# Patient Record
Sex: MALE | Race: WHITE
[De-identification: names, ages, dates, MRNs, and addresses within clinical notes are randomized per-mention and may not be internally consistent; named-entity substitution may affect disease eponyms.]

---

## 2017-10-25 ENCOUNTER — HOSPITAL ENCOUNTER (EMERGENCY)
Dept: HOSPITAL 35 - ER | Age: 52
Discharge: HOME | End: 2017-10-25
Payer: COMMERCIAL

## 2017-10-25 VITALS — WEIGHT: 290 LBS | HEIGHT: 75 IN | BODY MASS INDEX: 36.06 KG/M2

## 2017-10-25 DIAGNOSIS — R20.2: ICD-10-CM

## 2017-10-25 DIAGNOSIS — I26.99: ICD-10-CM

## 2017-10-25 DIAGNOSIS — J90: Primary | ICD-10-CM

## 2017-10-25 DIAGNOSIS — J18.9: ICD-10-CM

## 2017-10-25 LAB
ANION GAP SERPL CALC-SCNC: 6 MMOL/L (ref 7–16)
APTT BLD: 47.1 SECONDS (ref 24.5–32.8)
BASOPHILS NFR BLD AUTO: 0.7 % (ref 0–2)
BUN SERPL-MCNC: 15 MG/DL (ref 7–18)
CALCIUM SERPL-MCNC: 9.2 MG/DL (ref 8.5–10.1)
CHLORIDE SERPL-SCNC: 104 MMOL/L (ref 98–107)
CO2 SERPL-SCNC: 27 MMOL/L (ref 21–32)
CREAT SERPL-MCNC: 1.4 MG/DL (ref 0.7–1.3)
EOSINOPHIL NFR BLD: 2.1 % (ref 0–3)
ERYTHROCYTE [DISTWIDTH] IN BLOOD BY AUTOMATED COUNT: 13.9 % (ref 10.5–14.5)
GLUCOSE SERPL-MCNC: 104 MG/DL (ref 74–106)
GRANULOCYTES NFR BLD MANUAL: 65.4 % (ref 36–66)
HCT VFR BLD CALC: 40.1 % (ref 42–52)
HGB BLD-MCNC: 13.4 GM/DL (ref 14–18)
INR PPP: 3.1
LYMPHOCYTES NFR BLD AUTO: 21 % (ref 24–44)
MANUAL DIFFERENTIAL PERFORMED BLD QL: NO
MCH RBC QN AUTO: 30.7 PG (ref 26–34)
MCHC RBC AUTO-ENTMCNC: 33.4 G/DL (ref 28–37)
MCV RBC: 91.8 FL (ref 80–100)
MONOCYTES NFR BLD: 10.8 % (ref 1–8)
NEUTROPHILS # BLD: 5.1 THOU/UL (ref 1.4–8.2)
PLATELET # BLD: 325 THOU/UL (ref 150–400)
POTASSIUM SERPL-SCNC: 4.1 MMOL/L (ref 3.5–5.1)
PROTHROMBIN TIME: 30.8 SECONDS (ref 9.3–11.4)
RBC # BLD AUTO: 4.37 MIL/UL (ref 4.5–6)
SODIUM SERPL-SCNC: 137 MMOL/L (ref 136–145)
WBC # BLD AUTO: 7.9 THOU/UL (ref 4–11)

## 2017-12-26 ENCOUNTER — HOSPITAL ENCOUNTER (OUTPATIENT)
Dept: HOSPITAL 35 - RAD | Age: 52
End: 2017-12-26
Attending: FAMILY MEDICINE
Payer: COMMERCIAL

## 2017-12-26 DIAGNOSIS — I27.82: Primary | ICD-10-CM

## 2017-12-26 DIAGNOSIS — R91.8: ICD-10-CM

## 2017-12-26 DIAGNOSIS — J98.11: ICD-10-CM

## 2017-12-26 DIAGNOSIS — J18.9: ICD-10-CM

## 2018-01-30 ENCOUNTER — HOSPITAL ENCOUNTER (OUTPATIENT)
Dept: HOSPITAL 35 - RAD | Age: 53
End: 2018-01-30
Attending: NURSE PRACTITIONER
Payer: COMMERCIAL

## 2018-01-30 DIAGNOSIS — R05: ICD-10-CM

## 2018-01-30 DIAGNOSIS — R06.02: Primary | ICD-10-CM

## 2019-01-16 ENCOUNTER — HOSPITAL ENCOUNTER (OUTPATIENT)
Dept: HOSPITAL 35 - CAT | Age: 54
End: 2019-01-16
Attending: FAMILY MEDICINE
Payer: COMMERCIAL

## 2019-01-16 DIAGNOSIS — Z13.6: Primary | ICD-10-CM

## 2019-01-16 DIAGNOSIS — E78.00: ICD-10-CM

## 2019-01-16 DIAGNOSIS — Z82.49: ICD-10-CM

## 2019-03-29 ENCOUNTER — HOSPITAL ENCOUNTER (EMERGENCY)
Dept: HOSPITAL 44 - ED | Age: 54
Discharge: HOME | End: 2019-03-29
Payer: COMMERCIAL

## 2019-03-29 VITALS
SYSTOLIC BLOOD PRESSURE: 121 MMHG | DIASTOLIC BLOOD PRESSURE: 68 MMHG | SYSTOLIC BLOOD PRESSURE: 121 MMHG | DIASTOLIC BLOOD PRESSURE: 68 MMHG

## 2019-03-29 DIAGNOSIS — J18.9: Primary | ICD-10-CM

## 2019-03-29 LAB
BASOPHILS NFR BLD: 0.6 % (ref 0–1.5)
EGFR (NON-AFRICAN): > 60
EOSINOPHIL NFR BLD: 2.9 % (ref 0–6.8)
MCH RBC QN AUTO: 31.9 PG (ref 28–34)
MCV RBC AUTO: 95 FL (ref 80–100)
MONOCYTES %: 7 % (ref 0–11)
NEUTROPHILS #: 5.9 # K/UL (ref 1.4–7.7)

## 2019-03-29 PROCEDURE — 99283 EMERGENCY DEPT VISIT LOW MDM: CPT

## 2019-03-29 PROCEDURE — 85025 COMPLETE CBC W/AUTO DIFF WBC: CPT

## 2019-03-29 PROCEDURE — 99284 EMERGENCY DEPT VISIT MOD MDM: CPT

## 2019-03-29 PROCEDURE — 96374 THER/PROPH/DIAG INJ IV PUSH: CPT

## 2019-03-29 PROCEDURE — 36415 COLL VENOUS BLD VENIPUNCTURE: CPT

## 2019-03-29 PROCEDURE — S1016 NON-PVC INTRAVENOUS ADMINIST: HCPCS

## 2019-03-29 PROCEDURE — 87400 INFLUENZA A/B EACH AG IA: CPT

## 2019-03-29 PROCEDURE — 71275 CT ANGIOGRAPHY CHEST: CPT

## 2019-03-29 PROCEDURE — 80053 COMPREHEN METABOLIC PANEL: CPT

## 2019-03-29 NOTE — ED PHYSICIAN DOCUMENTATION
Upper Respiratory Symptoms





- HISTORIAN


Historian: patient





- HPI


Stated Complaint: cough


Chief Complaint: Cough/ Upper Respiratory


Additional Information: 





Patient presents to ED with a 2 week history of cough.  He reports he was di

agnosed with pneumonia about 10 days ago while he was in .   He just finished 

a 7 day course of Doxycycline on wednesday.  Today his cough has worsened and he

has had some shortness of breath and hot flashes.  He is concerned about a PE as

he had one in 2017.  


Onset: days ago (14)


Duration: intermittent episodes


Context: denies: recent foreign travel


Severity: moderate


Associated Symptoms: sweating, shortness of breath


Worsened by Deep Breath: No


Further Comments: no





- ROS


CONST/EYES: denies: weakness


CVS/RESP: shortness of breath


LYMPH: denies: ankle swelling


GI/: denies: vomiting, nausea


NEURO/PSYCH: denies: dizziness


MS/SKIN: denies: muscle aches





- PAST HX


Lung Disease: none


PE Risk Factors: none


Surgeries/Procedures: none


Allergies/Adverse Reactions: 


                                    Allergies











Allergy/AdvReac Type Severity Reaction Status Date / Time


 


No Known Drug Allergies Allergy   Verified 03/29/19 14:34














Home Medications: 


                                Ambulatory Orders











 Medication  Instructions  Recorded


 


Albuterol Sulfate [Proventil Hfa] 6.7 gm IH 03/29/19


 


Benzonatate [Tessalon Perles] 100 mg PO TID 03/29/19


 


Levofloxacin [Levaquin] 750 mg PO DAILY #10 tablet 03/29/19


 


predniSONE [Deltasone] 10 mg PO AS DIRECTED #31 tablet 03/29/19














- SOCIAL HX


Smoking History: non-smoker


Alcohol Use: none


Drug Use: none





- FAMILY HX


Family History: none





- VITAL SIGNS


Vital Signs: 





                                   Vital Signs











Temp Pulse Resp BP Pulse Ox


 


 98.8 F   67   18   119/67   91 L


 


 03/29/19 14:16  03/29/19 14:16  03/29/19 14:16  03/29/19 14:16  03/29/19 14:16














- REVIEWED ASSESSMENTS


Nursing Assessment  Reviewed: Yes


Vitals Reviewed: Yes





ED Results Lab/Radiology





- Lab Results


Lab Results: 





                                   Lab Results











  03/29/19 03/29/19 03/29/19





  14:45 14:45 14:30


 


WBC    9.10 K/ul K/ul  





    (4.00-12.00)  


 


RBC    4.57 M/ul M/ul  





    (3.90-5.20)  


 


Hgb    14.6 g/dL g/dL  





    (12.0-18.0)  


 


Hct    43.4 % %  





    (37.0-53.0)  


 


MCV    95.0 fl fl  





    (80.0-100.0)  


 


MCH    31.9 pg pg  





    (28.0-34.0)  


 


MCHC    33.6 g/dL g/dL  





    (30.0-36.0)  


 


RDW    12.7 % %  





    (11.3-14.3)  


 


Plt Count    246 K/mm3 K/mm3  





    (130-400)  


 


Neut % (Auto)    64.4 % %  





    (39.0-79.0)  


 


Lymph % (Auto)    25.1 % %  





    (16.0-50.0)  


 


Mono % (Auto)    7.0 % %  





    (0.0-11.0)  


 


Eos % (Auto)    2.9 % %  





    (0.0-6.8)  


 


Baso % (Auto)    0.6   





    (0.0-1.5)  


 


Neut # (Auto)    5.9 # k/uL # k/uL  





    (1.4-7.7)  


 


Lymph # (Auto)    2.3 # k/uL # k/uL  





    (0.6-4.0)  


 


Mono # (Auto)    0.6 # k/uL # k/uL  





    (0.0-0.9)  


 


Eos # (Auto)    0.3 # k/uL # k/uL  





    (0.0-0.6)  


 


Baso # (Auto)    0.1 # k/uL # k/uL  





    (0.0-0.5)  


 


Sodium  140 mmol/L mmol/L    





   (136-145)   


 


Potassium  4.2 mmol/L mmol/L    





   (3.5-5.1)   


 


Chloride  101 mmol/L mmol/L    





   ()   


 


Carbon Dioxide  30 mmol/L mmol/L    





   (22-30)   


 


BUN  15 mg/dL mg/dL    





   (9-20)   


 


Creatinine  1.10 mg/dL mg/dL    





   (0.66-1.25)   


 


Estimated Creat Clear  155     





    


 


Est GFR ( Amer)  > 60     





  (60 - )  


 


Est GFR (Non-Af Amer)  > 60     





  (60 - )  


 


Glucose  96 mg/dL mg/dL    





   ()   


 


Calcium  8.8 mg/dL mg/dL    





   (8.4-10.2)   


 


Total Bilirubin  0.3 mg/dL mg/dL    





   (0.2-1.3)   


 


AST  62 U/L H U/L    





   (15-46)   


 


ALT  32 U/L U/L    





   (13-69)   


 


Alkaline Phosphatase  64 U/L U/L    





   ()   


 


Total Protein  8.4 g/dL H g/dL    





   (6.3-8.2)   


 


Albumin  4.3 g/dL g/dL    





   (3.5-5.0)   


 


Influenza Type A Ag      Negative 





     (NEGATIVE) 


 


Influenza Type B Ag      Negative 





     (NEGATIVE) 














- Orders


Orders: 





                                    ED Orders











 Category Date Time Status


 


 Place IV Lock 1T Care  03/29/19 14:28 Active


 


 CT CHEST ANGIOGRAPHY [CT PE CHEST] Stat Exams  03/29/19 Taken


 


 CBC/PLATELET/DIFF Routine Lab  03/29/19 14:45 Completed


 


 CMP Routine Lab  03/29/19 14:45 Completed


 


 INFLUENZA A&B Stat Lab  03/29/19 14:30 Completed


 


 methylPREDNISolone SOD SUCC [Solu-MEDROL] Med  03/29/19 16:24 Once





 125 mg IVP NOW ONE   














Upper Respiratory Symptoms





- EXAM


General Appearance: no acute distress, alert


EENT: nml ENT inspection


Neck: supple, lymphadenopathy


Respiratory: no resp. distress, rhonchi (right basilar)


Abdomen: non-tender, nml bowel sounds, no distention.  No: tenderness


CVS: reg rate & rhythm, heart sounds normal


Skin: warm,dry.  No: cyanosis


Extremities: non-tender, no edema


Neuro/Psych: oriented x3, mood/affect nml





Discharge


Clincal Impression: 


Pneumonia


Qualifiers:


 Pneumonia type: due to other aerobic Gram-negative bacteria Laterality: right 

Lung location: lower lobe of lung Qualified Code(s): J15.6 - Pneumonia due to 

other Gram-negative bacteria





Prescriptions: 


Levofloxacin [Levaquin] 750 mg PO DAILY #10 tablet


predniSONE [Deltasone] 10 mg PO AS DIRECTED #31 tablet


Referrals: 


Aguila Mccallum MD [Primary Care Provider] - 2 Days


Additional Instructions: 


1.  Complete antibiotics


2.  Drink plenty of fluids for proper hydration


3.  Follow up with PCP within 1 week.  You need a repeat CT chest in 3 months.  

IF symptoms persist a pulmonology evaluation would be necessary.


4.  Return to ER for new or worsening symptoms


Condition: Stable


Disposition: 01 HOME, SELF-CARE


Decision to Admit: NO


Date of Decison to Admit: 03/29/19


Decision Time: 16:35

## 2019-03-30 NOTE — DIAGNOSTIC IMAGING REPORT
BARBARA SARINA ABDIEY 

[facility]

[address] [facility] [address] [address]

 

 

 

 

Report Submission Date: Mar 29, 2019 4:10:57 PM CDT

Patient       Study

Name:   POLLO FRENCH       Date:   Mar 29, 2019 3:30:54 PM CDT

MRN:   [id number]       Modality Type:   CT\SR

Gender:   M       Description:   CT PE CHEST

:   63       Institution:   [facility]

Physician:   BARBARA SWAIN

     Accession:    [id number]

 

 

CT chest PE protocol 



History:  Cough and shortness of breath 



Technique:  Helically acquired images were obtained through the chest following 
IV contrast.  3D MIPS were obtained. 



Findings:  No filling defects are identified within the pulmonary arteries to 
suggest pulmonary embolism.  The thoracic aorta is normal in caliber.  Heart 
size is normal.  There is no pericardial or pleural effusion.  The visualized 
liver demonstrates hepatic steatosis.  The gallbladder, adrenal glands and 
pancreas are unremarkable.  The visualized spleen is normal in size.  There is 
no mediastinal or hilar lymphadenopathy.  No osseous abnormalities are noted.  A
few very faint and mildly nodular appearing parenchymal densities are present 
posteriorly at the right lung base, likely indicating a mild infectious or 
inflammatory process.  The largest of these faint densities measures 
approximately 7 mm.  Otherwise, the lungs are clear. 



Impression: 



No evidence for pulmonary embolism. 



Posteriorly at the deep right lung base, there are small, faint parenchymal 
densities.  The largest of these measures 7 mm.  These findings are somewhat 
nodular but are hazy in appearance and likely are infectious or inflammatory in 
nature.  Consider follow-up chest CT in 3 months time after antibiotic treatment
to determine if these small foci resolve.   



Hepatic steatosis.

 

Electronically signed on Mar 29, 2019 4:10:57 PM CDT by:

Samara CAMPOS

## 2019-04-15 ENCOUNTER — HOSPITAL ENCOUNTER (OUTPATIENT)
Dept: HOSPITAL 44 - RT | Age: 54
End: 2019-04-15
Attending: FAMILY MEDICINE
Payer: COMMERCIAL

## 2019-04-15 DIAGNOSIS — R06.02: Primary | ICD-10-CM

## 2019-04-15 PROCEDURE — 94060 EVALUATION OF WHEEZING: CPT
